# Patient Record
Sex: FEMALE | Race: WHITE | Employment: FULL TIME | URBAN - METROPOLITAN AREA
[De-identification: names, ages, dates, MRNs, and addresses within clinical notes are randomized per-mention and may not be internally consistent; named-entity substitution may affect disease eponyms.]

---

## 2020-09-24 ENCOUNTER — TELEPHONE (OUTPATIENT)
Dept: PRIMARY CARE CLINIC | Age: 30
End: 2020-09-24

## 2020-10-01 PROBLEM — G93.5 CHIARI MALFORMATION TYPE I (HCC): Status: ACTIVE | Noted: 2020-10-01

## 2020-10-01 PROBLEM — F41.3 OTHER MIXED ANXIETY DISORDERS: Status: ACTIVE | Noted: 2020-10-01

## 2020-10-01 PROBLEM — G43.101 MIGRAINE WITH AURA AND WITH STATUS MIGRAINOSUS, NOT INTRACTABLE: Status: ACTIVE | Noted: 2020-10-01

## 2020-10-01 RX ORDER — BUSPIRONE HYDROCHLORIDE 10 MG/1
TABLET ORAL
Qty: 180 TAB | Refills: 0 | Status: SHIPPED | OUTPATIENT
Start: 2020-10-01 | End: 2020-11-24 | Stop reason: SDUPTHER

## 2020-11-18 ENCOUNTER — VIRTUAL VISIT (OUTPATIENT)
Dept: PRIMARY CARE CLINIC | Age: 30
End: 2020-11-18

## 2020-11-18 DIAGNOSIS — R73.01 ABNORMAL FASTING GLUCOSE: Chronic | ICD-10-CM

## 2020-11-18 DIAGNOSIS — K21.9 GASTROESOPHAGEAL REFLUX DISEASE WITHOUT ESOPHAGITIS: Primary | Chronic | ICD-10-CM

## 2020-11-18 DIAGNOSIS — R79.89 ABNORMAL LIVER FUNCTION TESTS: Chronic | ICD-10-CM

## 2020-11-18 DIAGNOSIS — E28.2 PCOS (POLYCYSTIC OVARIAN SYNDROME): Chronic | ICD-10-CM

## 2020-11-18 RX ORDER — DEXLANSOPRAZOLE 60 MG/1
CAPSULE, DELAYED RELEASE ORAL
COMMUNITY
Start: 2020-10-19 | End: 2020-11-18

## 2020-11-18 RX ORDER — TOPIRAMATE 50 MG/1
1 TABLET, FILM COATED ORAL 3 TIMES DAILY
COMMUNITY

## 2020-11-18 RX ORDER — SPIRONOLACTONE 100 MG/1
100 TABLET, FILM COATED ORAL DAILY
Qty: 90 TAB | Refills: 1 | Status: SHIPPED | OUTPATIENT
Start: 2020-11-18

## 2020-11-18 RX ORDER — DEXLANSOPRAZOLE 60 MG/1
60 CAPSULE, DELAYED RELEASE ORAL DAILY
Qty: 90 CAP | Refills: 1 | Status: SHIPPED | OUTPATIENT
Start: 2020-11-18 | End: 2021-05-17

## 2020-11-18 RX ORDER — VORTIOXETINE 20 MG/1
TABLET, FILM COATED ORAL
COMMUNITY
Start: 2020-09-14

## 2020-11-18 RX ORDER — ARIPIPRAZOLE 5 MG/1
5 TABLET ORAL DAILY
COMMUNITY

## 2020-11-18 RX ORDER — ARIPIPRAZOLE 2 MG/1
2 TABLET ORAL DAILY
COMMUNITY
End: 2020-11-18

## 2020-11-18 RX ORDER — NORETHINDRONE 0.35 MG
KIT ORAL
COMMUNITY
Start: 2020-11-13

## 2020-11-18 RX ORDER — ALPRAZOLAM 0.5 MG/1
TABLET ORAL
COMMUNITY

## 2020-11-18 RX ORDER — SPIRONOLACTONE 100 MG/1
TABLET, FILM COATED ORAL
COMMUNITY
Start: 2020-10-01 | End: 2020-11-18

## 2020-11-18 RX ORDER — ERENUMAB-AOOE 70 MG/ML
INJECTION SUBCUTANEOUS
COMMUNITY
Start: 2020-11-11

## 2020-11-18 RX ORDER — SPIRONOLACTONE 100 MG/1
TABLET, FILM COATED ORAL
Qty: 90 TAB | Refills: 1 | Status: CANCELLED | OUTPATIENT
Start: 2020-11-18

## 2020-11-18 RX ORDER — DEXLANSOPRAZOLE 60 MG/1
CAPSULE, DELAYED RELEASE ORAL
Qty: 90 CAP | Refills: 1 | Status: CANCELLED | OUTPATIENT
Start: 2020-11-18

## 2020-11-18 RX ORDER — LAMOTRIGINE 25 MG/1
TABLET ORAL
COMMUNITY
Start: 2020-11-06

## 2020-11-18 NOTE — PROGRESS NOTES
HISTORY OF PRESENT ILLNESS  THIS IS  VIDEO VISIT  OCCURRED WITH CONSENT FORM THE PATIENT AND PERFORMED THROUGH THE SECURE WEBSITE [employer].     PCOS/ metabolic syndrome  PT taking spironolactone daily. She ahs lasot 50 lbs since last seen. She has been working on diet. Checks BP at home BP- no.    is ot aware of A1c being checked in the past.      Depression :  Now followed by  Psychiatric NP at  [place] [id number] office. She reports doing well and is greatful we sent here there. ALT was up here in 2/2020- does not  Have hx of hep b or C or fatty liver. Does bot recall knwing about this and no prior w/u known. Erika Higgins is a 27 y.o. female. Past Medical History:   Diagnosis Date    Allergies     Cystic acne     Depression     GERD (gastroesophageal reflux disease)     IBS (irritable bowel syndrome)     Migraines     Other mixed anxiety disorders 10/1/2020     Social History     Tobacco Use    Smoking status: Current Every Day Smoker     Packs/day: 0.25     Types: Cigarettes    Smokeless tobacco: Never Used   Substance Use Topics    Alcohol use: No    Drug use: No       Family History   Problem Relation Age of Onset    Migraines Mother     Thyroid Disease Mother     Hypertension Mother     Other Mother         fibromyaligia, ibs    Diabetes Father     Heart Disease Father     Depression Father     Anxiety Father     High Cholesterol Father     Dementia Other     Deafness Maternal Aunt     Depression Maternal Aunt     Hypertension Maternal Aunt        Review of Systems   Constitutional: Positive for weight loss. Negative for chills, fever and malaise/fatigue. HENT: Negative for sore throat. Respiratory: Negative for cough and shortness of breath. Cardiovascular: Negative for chest pain, palpitations and leg swelling. Gastrointestinal: Negative for abdominal pain and heartburn. Musculoskeletal: Negative for myalgias. Skin: Negative for itching and rash. Neurological: Negative for dizziness, tremors, weakness and headaches. Endo/Heme/Allergies: Negative for polydipsia. Psychiatric/Behavioral: Negative for depression. There were no vitals taken for this visit. Physical Exam  Nursing note reviewed. Constitutional:       Appearance: Normal appearance. She is obese. HENT:      Head: Normocephalic and atraumatic. Eyes:      Extraocular Movements: Extraocular movements intact. Conjunctiva/sclera: Conjunctivae normal.   Pulmonary:      Effort: Pulmonary effort is normal. No respiratory distress. Abdominal:      Palpations: Abdomen is soft. Neurological:      General: No focal deficit present. Mental Status: She is alert and oriented to person, place, and time. Psychiatric:         Mood and Affect: Mood normal.         Behavior: Behavior normal.         Thought Content: Thought content normal.           ASSESSMENT and PLAN  Diagnoses and all orders for this visit:    1. Gastroesophageal reflux disease without esophagitis  Comments:  ok on dexilant. no breakthru  Orders:  -     METABOLIC PANEL, COMPREHENSIVE  -     CBC WITH AUTOMATED DIFF    2. PCOS (polycystic ovarian syndrome)  Comments:  no s/e on spiroloactone  Orders:  -     METABOLIC PANEL, COMPREHENSIVE  -     LIPID PANEL    3. Abnormal liver function tests  Comments:  ALT was 56 in Feb.  no hx of liver disease. check for hep B&C .  may need LFts  Orders:  -     METABOLIC PANEL, COMPREHENSIVE  -     HEPATITIS PANEL, ACUTE    4. Abnormal fasting glucose  Comments:  FBS was 100 and Tg was 189 so recheck, add a1c  Orders:  -     LIPID PANEL  -     HEMOGLOBIN A1C WITH EAG    Other orders  -     Dexilant 60 mg CpDB capsule (delayed release); Take 1 Cap by mouth daily for 180 days. Indications: gastroesophageal reflux disease  -     spironolactone (ALDACTONE) 100 mg tablet; Take 1 Tab by mouth daily.  Indications: polycystic ovarian syndrome, a disease with cysts in the ovaries Orders Placed This Encounter    METABOLIC PANEL, COMPREHENSIVE    CBC WITH AUTOMATED DIFF    LIPID PANEL    HEPATITIS PANEL, ACUTE    HEMOGLOBIN A1C WITH EAG    ALPRAZolam (XANAX) 0.5 mg tablet    DISCONTD: ARIPiprazole (ABILIFY) 2 mg tablet    ARIPiprazole (Abilify) 5 mg tablet    Trintellix 20 mg tablet    DISCONTD: Dexilant 60 mg CpDB capsule (delayed release)    DISCONTD: spironolactone (ALDACTONE) 100 mg tablet    topiramate (TOPAMAX) 50 mg tablet    Aimovig Autoinjector 70 mg/mL injection    Norlyda 0.35 mg tab    lamoTRIgine (LaMICtal) 25 mg tablet    Dexilant 60 mg CpDB capsule (delayed release)    spironolactone (ALDACTONE) 100 mg tablet     Follow-up and Dispositions    · Return in about 6 months (around 5/18/2021) for Chronic office visit.